# Patient Record
Sex: MALE | Race: WHITE | URBAN - METROPOLITAN AREA
[De-identification: names, ages, dates, MRNs, and addresses within clinical notes are randomized per-mention and may not be internally consistent; named-entity substitution may affect disease eponyms.]

---

## 2021-10-14 ENCOUNTER — HISTORICAL (OUTPATIENT)
Dept: ADMINISTRATIVE | Facility: HOSPITAL | Age: 61
End: 2021-10-14

## 2021-10-14 LAB
ABS NEUT (OLG): 2.38 X10(3)/MCL (ref 2.1–9.2)
ALBUMIN SERPL-MCNC: 4.4 GM/DL (ref 3.4–4.8)
ALBUMIN/GLOB SERPL: 1.8 RATIO (ref 1.1–2)
ALP SERPL-CCNC: 50 UNIT/L (ref 40–150)
ALT SERPL-CCNC: 18 UNIT/L (ref 0–55)
AST SERPL-CCNC: 17 UNIT/L (ref 5–34)
BASOPHILS # BLD AUTO: 0 X10(3)/MCL (ref 0–0.2)
BASOPHILS NFR BLD AUTO: 1 %
BILIRUB SERPL-MCNC: 0.9 MG/DL
BILIRUBIN DIRECT+TOT PNL SERPL-MCNC: 0.3 MG/DL (ref 0–0.5)
BILIRUBIN DIRECT+TOT PNL SERPL-MCNC: 0.6 MG/DL (ref 0–0.8)
BUN SERPL-MCNC: 21 MG/DL (ref 8.4–25.7)
CALCIUM SERPL-MCNC: 9.8 MG/DL (ref 8.8–10)
CHLORIDE SERPL-SCNC: 104 MMOL/L (ref 98–107)
CO2 SERPL-SCNC: 26 MMOL/L (ref 23–31)
CREAT SERPL-MCNC: 0.94 MG/DL (ref 0.73–1.18)
EOSINOPHIL # BLD AUTO: 0.2 X10(3)/MCL (ref 0–0.9)
EOSINOPHIL NFR BLD AUTO: 4 %
ERYTHROCYTE [DISTWIDTH] IN BLOOD BY AUTOMATED COUNT: 13.2 % (ref 11.5–17)
GLOBULIN SER-MCNC: 2.5 GM/DL (ref 2.4–3.5)
GLUCOSE SERPL-MCNC: 89 MG/DL (ref 82–115)
HCT VFR BLD AUTO: 46.3 % (ref 42–52)
HGB BLD-MCNC: 15.3 GM/DL (ref 14–18)
LYMPHOCYTES # BLD AUTO: 1.2 X10(3)/MCL (ref 0.6–4.6)
LYMPHOCYTES NFR BLD AUTO: 28 %
MCH RBC QN AUTO: 30.2 PG (ref 27–31)
MCHC RBC AUTO-ENTMCNC: 33 GM/DL (ref 33–36)
MCV RBC AUTO: 91.3 FL (ref 80–94)
MONOCYTES # BLD AUTO: 0.6 X10(3)/MCL (ref 0.1–1.3)
MONOCYTES NFR BLD AUTO: 13 %
NEUTROPHILS # BLD AUTO: 2.38 X10(3)/MCL (ref 2.1–9.2)
NEUTROPHILS NFR BLD AUTO: 54 %
PLATELET # BLD AUTO: 164 X10(3)/MCL (ref 130–400)
PMV BLD AUTO: 12.1 FL (ref 9.4–12.4)
POTASSIUM SERPL-SCNC: 4.9 MMOL/L (ref 3.5–5.1)
PROT SERPL-MCNC: 6.9 GM/DL (ref 5.8–7.6)
RBC # BLD AUTO: 5.07 X10(6)/MCL (ref 4.7–6.1)
SODIUM SERPL-SCNC: 138 MMOL/L (ref 136–145)
WBC # SPEC AUTO: 4.4 X10(3)/MCL (ref 4.5–11.5)

## 2021-10-15 ENCOUNTER — HISTORICAL (OUTPATIENT)
Dept: ADMINISTRATIVE | Facility: HOSPITAL | Age: 61
End: 2021-10-15

## 2021-10-15 LAB — SARS-COV-2 RNA RESP QL NAA+PROBE: NOT DETECTED

## 2021-10-19 ENCOUNTER — HOSPITAL ENCOUNTER (OUTPATIENT)
Dept: MEDSURG UNIT | Facility: HOSPITAL | Age: 61
End: 2021-10-20
Attending: UROLOGY | Admitting: UROLOGY

## 2021-10-19 LAB — GROUP & RH: NORMAL

## 2021-10-20 LAB
ABS NEUT (OLG): 5.2 X10(3)/MCL (ref 2.1–9.2)
BASOPHILS # BLD AUTO: 0 X10(3)/MCL (ref 0–0.2)
BASOPHILS NFR BLD AUTO: 0 %
BUN SERPL-MCNC: 10.2 MG/DL (ref 8.4–25.7)
CALCIUM SERPL-MCNC: 8.1 MG/DL (ref 8.7–10.5)
CHLORIDE SERPL-SCNC: 105 MMOL/L (ref 98–107)
CO2 SERPL-SCNC: 23 MMOL/L (ref 23–31)
CREAT SERPL-MCNC: 0.8 MG/DL (ref 0.73–1.18)
CREAT/UREA NIT SERPL: 13
EOSINOPHIL # BLD AUTO: 0 X10(3)/MCL (ref 0–0.9)
EOSINOPHIL NFR BLD AUTO: 0 %
ERYTHROCYTE [DISTWIDTH] IN BLOOD BY AUTOMATED COUNT: 13.2 % (ref 11.5–17)
GLUCOSE SERPL-MCNC: 117 MG/DL (ref 82–115)
HCT VFR BLD AUTO: 40.1 % (ref 42–52)
HGB BLD-MCNC: 13.1 GM/DL (ref 14–18)
LYMPHOCYTES # BLD AUTO: 1.2 X10(3)/MCL (ref 0.6–4.6)
LYMPHOCYTES NFR BLD AUTO: 15 %
MCH RBC QN AUTO: 29.6 PG (ref 27–31)
MCHC RBC AUTO-ENTMCNC: 32.7 GM/DL (ref 33–36)
MCV RBC AUTO: 90.5 FL (ref 80–94)
MONOCYTES # BLD AUTO: 1.1 X10(3)/MCL (ref 0.1–1.3)
MONOCYTES NFR BLD AUTO: 14 %
NEUTROPHILS # BLD AUTO: 5.2 X10(3)/MCL (ref 2.1–9.2)
NEUTROPHILS NFR BLD AUTO: 70 %
PLATELET # BLD AUTO: 147 X10(3)/MCL (ref 130–400)
PMV BLD AUTO: 11.6 FL (ref 9.4–12.4)
POTASSIUM SERPL-SCNC: 3.7 MMOL/L (ref 3.5–5.1)
RBC # BLD AUTO: 4.43 X10(6)/MCL (ref 4.7–6.1)
SODIUM SERPL-SCNC: 135 MMOL/L (ref 136–145)
WBC # SPEC AUTO: 7.5 X10(3)/MCL (ref 4.5–11.5)

## 2022-04-30 NOTE — OP NOTE
DATE OF SURGERY:    10/19/2021    SURGEON:  Tavon Nathan MD  ASSISTANT:  Harrison Henderson MD    PREOPERATIVE DIAGNOSIS:  Adenocarcinoma of prostate.    POSTOPERATIVE DIAGNOSIS:  Adenocarcinoma of prostate.    PROCEDURE:  Robotic-assisted laparoscopic prostatectomy.    ESTIMATED BLOOD LOSS:  230 cc.    OPERATIVE NOTE:  After informed consent was obtained, the patient was taken to the operating room after receiving a preoperative dose of antibiotics.  He was given a general anesthetic.  In the low lithotomy position, his abdomen, genitals, and lower extremities above the knees were all prepped and draped in usual sterile fashion.  We placed an 18-Greek Mao catheter through the urethra and into the bladder.  Following this, I made a small incision above the umbilicus and placed a Veress needle into the peritoneal cavity.  After a positive drop test, we insufflated the peritoneal cavity up to a pressure of 15 mmHg.  I placed an 8 mm robotic camera through that supraumbilical incision and placed the camera into the belly.  There was no evidence of any intraabdominal or vascular injuries.  In the right lower quadrant, I placed two 8 mm robotic trocars, the first 6-8 cm lateral to the umbilicus and the second 6-8 cm lateral to that.  I placed a third 8 mm robotic trocar in the left lower quadrant 8 cm lateral to the umbilicus and a 12 mm assistant port lateral to that.  I placed a 5 mm suction port in between the umbilicus and the robotic port on the left.  The patient was then placed in the extreme Trendelenburg position, and the da Rosalio XI robot was docked in the right arm.  I had the monopolar scissors in my left arm.  I had the fenestrated bipolar cautery, and in the 3rd arm, I had the ProGrasp instrument.  I started the case by incising the peritoneum just above the reflection of the colon.  I dissected down and identified the vas deferens.  Both of them were mobilized and divided.  Just lateral to  this, I found the seminal vesicles that were identified and mobilized all the way to the insertion of the prostate.  Following this, I opened up Denonvilliers fascia and created a plane between the rectum and the prostate posteriorly.  Following this, my attention was directed to the anterior abdominal wall.  I incised the peritoneum just lateral to the medial umbilical ligaments on both sides.  I came around the superior aspect of the bladder through the urachus.  I took the bladder off the anterior abdominal wall all the way down to the endopelvic fascia.  I took the fat off the endopelvic fascia.  I controlled the superficial dorsal venous complex with bipolar cautery.  I took all the fat off the endopelvic fascia and off the bladder neck and placed this into the left pericolic gutter.  With the aid of the balloon on the catheter, I identified the vesical neck using a combination of cautery and sharp dissection.  I took the bladder neck off the base of the prostate anteriorly.  I made a small anterior cystotomy.  I took the balloon down from the Mao catheter and brought the Mao up through the cystotomy.  I carefully dissected through the posterior bladder neck and went back through Denonvilliers fascia and brought my vas and seminal vesicles up through that incision.  Using mostly sharp dissection, I got around the neurovascular bundles in a bilateral nerve sparing technique.  I took the nerves down past the apex of the prostate.  Following this, I took the remaining attachments of the rectum posteriorly off the prostate.  I then divided the urethra just distal to the apex of the prostate.  I placed the prostate in the left pericolic gutter.  Focal bipolar cautery was used to obtain hemostasis.  I then reconstructed the rhabdosphincter to the Denonvilliers fascia posterior to the bladder with a running 3-0 V-Loc stitch.  Following this, I performed a vesicourethral anastomosis over an 18-Frisian catheter using  a running 3-0 V-Loc stitch.  Following this, we put hemoblast in and around the dorsal venous complex and in the pelvis for hemostasis.  I placed the prostate, seminal vesicles, and perivesical fat in the EndoCatch bag and brought that out through my supraumbilical incision.  I placed a #10 HELENA drain in the pelvis and I left that exiting the 12 mm assistant port.  Following this, all the ports were removed.  I extended my supraumbilical incision to remove the specimen, and this was sent to the surgical pathology lab.  I closed the midline incision with a running #1 Vicryl suture followed by a 3-0 nylon stitch for the drain.  I closed all the skin with 4-0 Monocryl sutures.  He tolerated the procedure well.  There were no apparent complications.  Estimated blood loss was 230 cc.  He was extubated and brought to recovery in good condition.        ______________________________  MD HALEY Callejas/JUAN ALBERTO  DD:  10/19/2021  Time:  10:45AM  DT:  10/19/2021  Time:  10:59AM  Job #:  [id number]

## 2022-04-30 NOTE — DISCHARGE SUMMARY
DISCHARGE DATE:  10/20/2021    CHIEF COMPLAINT:  Prostate cancer.    HISTORY OF PRESENT ILLNESS:  A 61-year-old male with a history of adenocarcinoma of the prostate.  He is being admitted for elective surgery.    HOSPITAL COURSE:  The patient was admitted to the hospital on 10/19/2021.  He was taken to the operating room where he underwent a robotic-assisted laparoscopic prostatectomy that was well tolerated and uncomplicated.  Postoperatively, he was transferred to the floor where his only complaint was burning in the eyes and this was treated with Toradol eyedrops by the anesthesiology service.  He has had minimal abdominal pain.  His vital signs have been stable.  He is afebrile.  He has had good urine output.  He will be discharged home this afternoon and follow up with me next week for catheter removal.        ______________________________  MD HALEY Callejas/UB  DD:  10/20/2021  Time:  07:33AM  DT:  10/20/2021  Time:  07:37AM  Job #:  [id number]